# Patient Record
(demographics unavailable — no encounter records)

---

## 2024-10-28 NOTE — HISTORY OF PRESENT ILLNESS
[de-identified] : 89-year-old s/p ORIF intramedullary nailing Left femur 9/8/24 [de-identified] : Patient sustained a fall and was treated for left femur intertrochanteric fracture.  She was also treated for refracture of the left patella and severely comminuted fracture of the proximal left humerus conservatively.  Patient had additional problems including subdural hematoma. [de-identified] : Patient is severely deconditioned.  She has severe difficulty getting out of wheelchair with two-person assist.  Wounds are healing very well.  Left humerus range of motion is severely limited.  She is neurovascular intact in bilateral upper and lower extremities.  Left patella fracture still has minimal tenderness to palpation but patient has good strength in resisted extension of the left knee. [de-identified] : Severely comminuted fracture of the proximal left humerus with notable callus formation. Evidence of previous fracture of the distal pole of the left patella. Left femur intertrochanteric fracture with excellent reduction and position of the nail components.  Healing changes are noted. [de-identified] : All these findings were discussed with the patient and her daughter.  Patient is currently in rehabilitation center and is not getting appropriate aggressive physical therapy.  Patient needs conditioning and strength training.  She needs to be trying to get out of bed to chair.  Fractures appear to be healing well.  We discussed this case with trauma specialist who is treating her humeral fracture.  Patient will be seen Dr. Mullen in his office. [de-identified] : Aggressive physical therapy geared towards discharge home.  Discontinue left knee immobilizer.  Weightbearing as tolerated left lower extremity.  Patient will follow-up with Dr. Mullen for left humerus treatment.  Follow-up in this office in 6 weeks.